# Patient Record
Sex: MALE | Race: WHITE
[De-identification: names, ages, dates, MRNs, and addresses within clinical notes are randomized per-mention and may not be internally consistent; named-entity substitution may affect disease eponyms.]

---

## 2020-04-22 ENCOUNTER — HOSPITAL ENCOUNTER (EMERGENCY)
Dept: HOSPITAL 8 - ED | Age: 55
Discharge: HOME | End: 2020-04-22
Payer: MEDICAID

## 2020-04-22 VITALS — DIASTOLIC BLOOD PRESSURE: 84 MMHG | SYSTOLIC BLOOD PRESSURE: 132 MMHG

## 2020-04-22 VITALS — HEIGHT: 72 IN | WEIGHT: 234.79 LBS | BODY MASS INDEX: 31.8 KG/M2

## 2020-04-22 DIAGNOSIS — R00.0: ICD-10-CM

## 2020-04-22 DIAGNOSIS — E86.0: ICD-10-CM

## 2020-04-22 DIAGNOSIS — R10.9: ICD-10-CM

## 2020-04-22 DIAGNOSIS — R11.2: ICD-10-CM

## 2020-04-22 DIAGNOSIS — K52.9: Primary | ICD-10-CM

## 2020-04-22 LAB
ALBUMIN SERPL-MCNC: 3.7 G/DL (ref 3.4–5)
ALP SERPL-CCNC: 119 U/L (ref 45–117)
ALT SERPL-CCNC: 26 U/L (ref 12–78)
ANION GAP SERPL CALC-SCNC: 5 MMOL/L (ref 5–15)
BASOPHILS # BLD AUTO: 0.03 X10^3/UL (ref 0–0.1)
BASOPHILS NFR BLD AUTO: 0 % (ref 0–1)
BILIRUB SERPL-MCNC: 0.6 MG/DL (ref 0.2–1)
CALCIUM SERPL-MCNC: 8.9 MG/DL (ref 8.5–10.1)
CHLORIDE SERPL-SCNC: 113 MMOL/L (ref 98–107)
CLOSTRIDIUM DIFFICILE ANTIGEN: NEGATIVE
CLOSTRIDIUM DIFFICILE TOXIN: NEGATIVE
CREAT SERPL-MCNC: 1.26 MG/DL (ref 0.7–1.3)
EOSINOPHIL # BLD AUTO: 0.07 X10^3/UL (ref 0–0.4)
EOSINOPHIL NFR BLD AUTO: 1 % (ref 1–7)
ERYTHROCYTE [DISTWIDTH] IN BLOOD BY AUTOMATED COUNT: 12.8 % (ref 9.4–14.8)
LYMPHOCYTES # BLD AUTO: 1.34 X10^3/UL (ref 1–3.4)
LYMPHOCYTES NFR BLD AUTO: 18 % (ref 22–44)
MCH RBC QN AUTO: 32.3 PG (ref 27.5–34.5)
MCHC RBC AUTO-ENTMCNC: 33.5 G/DL (ref 33.2–36.2)
MCV RBC AUTO: 96.4 FL (ref 81–97)
MD: NO
MONOCYTES # BLD AUTO: 0.65 X10^3/UL (ref 0.2–0.8)
MONOCYTES NFR BLD AUTO: 9 % (ref 2–9)
NEUTROPHILS # BLD AUTO: 5.28 X10^3/UL (ref 1.8–6.8)
NEUTROPHILS NFR BLD AUTO: 72 % (ref 42–75)
PLATELET # BLD AUTO: 224 X10^3/UL (ref 130–400)
PMV BLD AUTO: 7.6 FL (ref 7.4–10.4)
PROT SERPL-MCNC: 7.5 G/DL (ref 6.4–8.2)
RBC # BLD AUTO: 5.24 X10^6/UL (ref 4.38–5.82)

## 2020-04-22 PROCEDURE — 87324 CLOSTRIDIUM AG IA: CPT

## 2020-04-22 PROCEDURE — 93005 ELECTROCARDIOGRAM TRACING: CPT

## 2020-04-22 PROCEDURE — 87329 GIARDIA AG IA: CPT

## 2020-04-22 PROCEDURE — 36415 COLL VENOUS BLD VENIPUNCTURE: CPT

## 2020-04-22 PROCEDURE — 85025 COMPLETE CBC W/AUTO DIFF WBC: CPT

## 2020-04-22 PROCEDURE — 87328 CRYPTOSPORIDIUM AG IA: CPT

## 2020-04-22 PROCEDURE — 87046 STOOL CULTR AEROBIC BACT EA: CPT

## 2020-04-22 PROCEDURE — 83690 ASSAY OF LIPASE: CPT

## 2020-04-22 PROCEDURE — 89055 LEUKOCYTE ASSESSMENT FECAL: CPT

## 2020-04-22 PROCEDURE — 96361 HYDRATE IV INFUSION ADD-ON: CPT

## 2020-04-22 PROCEDURE — 87427 SHIGA-LIKE TOXIN AG IA: CPT

## 2020-04-22 PROCEDURE — 96360 HYDRATION IV INFUSION INIT: CPT

## 2020-04-22 PROCEDURE — 99284 EMERGENCY DEPT VISIT MOD MDM: CPT

## 2020-04-22 PROCEDURE — 80053 COMPREHEN METABOLIC PANEL: CPT

## 2020-04-22 NOTE — NUR
THIS IS A 54 YO M W/ C/O DIARRHEA X3 WEEKS. PT REPORTS NOT BEING ABLE TO HOLD 
IT IN. PT DENIES RECENT ABX/N/V/ABD PAIN. RECEIVED TELEPHONE CALL FROM PTS 
NIECE [facility]RE WHO REPORTS THEY HAVE NOT BEEN ABLE TO GET A HOLD OF HIM AND HER 
SISTER SAW HIM IN THE ED LOBBY AND DID NOT RECOGNIZE HER, THEY ARE CONCERNED 
FOR HIS MENTAL STATUS. PT IS a&oX4 FOR THIS RN. RESP EVEN AND UNLABORED. NADN. 
WILL CONTINUE TO MONITOR.

## 2021-09-30 ENCOUNTER — HOSPITAL ENCOUNTER (EMERGENCY)
Dept: HOSPITAL 8 - ED | Age: 56
Discharge: HOME | End: 2021-09-30
Payer: MEDICAID

## 2021-09-30 VITALS — BODY MASS INDEX: 37.4 KG/M2 | WEIGHT: 282.19 LBS | HEIGHT: 73 IN

## 2021-09-30 VITALS — SYSTOLIC BLOOD PRESSURE: 144 MMHG | DIASTOLIC BLOOD PRESSURE: 89 MMHG

## 2021-09-30 DIAGNOSIS — S01.412A: Primary | ICD-10-CM
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
